# Patient Record
Sex: FEMALE | Race: BLACK OR AFRICAN AMERICAN | NOT HISPANIC OR LATINO | Employment: FULL TIME | URBAN - METROPOLITAN AREA
[De-identification: names, ages, dates, MRNs, and addresses within clinical notes are randomized per-mention and may not be internally consistent; named-entity substitution may affect disease eponyms.]

---

## 2020-10-01 ENCOUNTER — OFFICE VISIT (OUTPATIENT)
Dept: SURGERY | Facility: CLINIC | Age: 75
End: 2020-10-01
Payer: MEDICARE

## 2020-10-01 VITALS
RESPIRATION RATE: 16 BRPM | OXYGEN SATURATION: 98 % | SYSTOLIC BLOOD PRESSURE: 150 MMHG | DIASTOLIC BLOOD PRESSURE: 90 MMHG | HEART RATE: 87 BPM | WEIGHT: 122 LBS

## 2020-10-01 DIAGNOSIS — Z12.11 ENCOUNTER FOR SCREENING COLONOSCOPY: Primary | ICD-10-CM

## 2020-10-01 PROCEDURE — 99203 PR OFFICE/OUTPT VISIT, NEW, LEVL III, 30-44 MIN: ICD-10-PCS | Mod: S$GLB,,, | Performed by: REGISTERED NURSE

## 2020-10-01 PROCEDURE — 99203 OFFICE O/P NEW LOW 30 MIN: CPT | Mod: S$GLB,,, | Performed by: REGISTERED NURSE

## 2020-10-01 RX ORDER — HYDROCHLOROTHIAZIDE 12.5 MG/1
CAPSULE ORAL
COMMUNITY
Start: 2020-08-06 | End: 2023-01-17

## 2020-10-01 RX ORDER — PRAVASTATIN SODIUM 20 MG/1
TABLET ORAL
COMMUNITY
Start: 2020-08-06

## 2020-10-01 RX ORDER — METHIMAZOLE 5 MG/1
TABLET ORAL
COMMUNITY
Start: 2020-08-06 | End: 2023-01-17

## 2020-10-01 RX ORDER — CLOTRIMAZOLE AND BETAMETHASONE DIPROPIONATE 10; .64 MG/G; MG/G
CREAM TOPICAL
COMMUNITY
Start: 2020-08-24 | End: 2023-01-17

## 2020-10-01 NOTE — LETTER
October 2, 2020      Melissa Vega MD  [address]  [place] [address]           [facility] General Surgery  [address] [address]  Phone: [phone]  Fax: [phone]          Patient: Sylvain Angeles   MR Number: [id number]   YOB: 1945   Date of Visit: 10/1/2020       Dear Dr. Melissa Vega:    Thank you for referring Sylvain Angeles to me for evaluation. Attached you will find relevant portions of my assessment and plan of care.    If you have questions, please do not hesitate to call me. I look forward to following Sylvain Angeles along with you.    Sincerely,    Magdy Mcneill, NP    Enclosure  CC:  No Recipients    If you would like to receive this communication electronically, please contact [email or web address] or [phone] to request more information on [employer] access.    For providers and/or their staff who would like to refer a patient to [facility], please contact us through our one-stop-shop provider referral line, Critical access hospitalierge, at [phone].    If you feel you have received this communication in error or would no longer like to receive these types of communications, please e-mail [email or web address]

## 2020-10-02 NOTE — PROGRESS NOTES
Subjective:       Patient ID: Sylvain Angeles is a 74 y.o. female.    Chief Complaint: Colon Cancer Screening    74-year-old female for by her primary care physician for screening colonoscopy.  Patient denies any history blood in stool, chronic constipation or other complications.  Patient denies any family history of colon cancer.  Patient does state that she had a previous colonoscopy approximately 15 years ago which was negative.    Review of Systems   Constitutional: Negative for chills, diaphoresis, fatigue and fever.   HENT: Negative for nasal congestion and rhinorrhea.    Eyes: Negative for discharge, redness and itching.   Respiratory: Negative for apnea, chest tightness and shortness of breath.    Cardiovascular: Negative for chest pain.   Gastrointestinal: Negative for abdominal distention.   Genitourinary: Negative for difficulty urinating and dysuria.   Musculoskeletal: Negative for arthralgias and gait problem.   Integumentary:  Negative for color change, pallor and rash.   Neurological: Negative for dizziness, syncope, weakness and headaches.   Psychiatric/Behavioral: Negative for behavioral problems.         Objective:      Physical Exam  Vitals signs reviewed.   Constitutional:       Appearance: She is well-developed.   HENT:      Head: Normocephalic.   Eyes:      Conjunctiva/sclera: Conjunctivae normal.   Neck:      Musculoskeletal: Normal range of motion.   Cardiovascular:      Rate and Rhythm: Normal rate and regular rhythm.   Pulmonary:      Effort: Pulmonary effort is normal.      Breath sounds: Normal breath sounds.   Abdominal:      General: Bowel sounds are normal.      Palpations: Abdomen is soft.   Musculoskeletal: Normal range of motion.   Skin:     General: Skin is warm and dry.      Capillary Refill: Capillary refill takes less than 2 seconds.   Neurological:      Mental Status: She is alert and oriented to person, place, and time.         Assessment:       No diagnosis found.    Plan:        Procedure for colonoscopy discussed with patient in detail including:  Bowel prep, diet and postprocedure instructions.  All patient's questions were answered to her satisfaction.  Patient stated understanding everything discussed in today's visit.  She was in agreement to proceed with scheduling screening colonoscopy.    Schedule for screening colonoscopy.

## 2020-10-13 LAB
CALCIUM BLD-MCNC: NEGATIVE MG/DL
COVID-19 AB, IGM: NEGATIVE
PATIENT EMPLOYED IN HEALTHCARE: NO
PATIENT HAS SYMPTOMS FOR CONDITION OF INTEREST: NO
PATIENT HOSPITALIZED BC COND: NO
PATIENT IN CONGREGATE CARE: NO

## 2020-10-15 ENCOUNTER — OUTSIDE PLACE OF SERVICE (OUTPATIENT)
Dept: SURGERY | Facility: CLINIC | Age: 75
End: 2020-10-15
Payer: MEDICARE

## 2020-10-15 LAB — GLUCOSE SERPL-MCNC: 149 MG/DL (ref 70–105)

## 2020-10-15 PROCEDURE — 45385 COLONOSCOPY W/LESION REMOVAL: CPT | Mod: PT,,, | Performed by: SURGERY

## 2020-10-15 PROCEDURE — 45385 PR COLONOSCOPY,REMV LESN,SNARE: ICD-10-PCS | Mod: PT,,, | Performed by: SURGERY

## 2020-10-19 LAB
SPECIMEN TO PATHOLOGY: NORMAL

## 2022-12-20 ENCOUNTER — TELEPHONE (OUTPATIENT)
Dept: GASTROENTEROLOGY | Facility: CLINIC | Age: 77
End: 2022-12-20
Payer: MEDICAID

## 2022-12-20 RX ORDER — GABAPENTIN 100 MG/1
100 CAPSULE ORAL 3 TIMES DAILY
COMMUNITY
End: 2023-01-17

## 2022-12-20 NOTE — TELEPHONE ENCOUNTER
Pt called stating for the past month she has been having a lot of abdominal pain for the past month.She also states that she has been having constipation. She just completed a Rx of Linzess 72 mcg. Pt reports going to the ER several times. Went several times to [facility] and the first time she went to [facility]. Will print records and scan into Epic. Please advise.

## 2022-12-21 ENCOUNTER — TELEPHONE (OUTPATIENT)
Dept: GASTROENTEROLOGY | Facility: CLINIC | Age: 77
End: 2022-12-21
Payer: MEDICAID

## 2022-12-21 NOTE — TELEPHONE ENCOUNTER
I contacted patient to let her know that if she feels she needs to be seen soon, she will need to contact her pcp office to get referred to provider that will have sooner appointment availability. Patient stated understanding. BF

## 2022-12-21 NOTE — TELEPHONE ENCOUNTER
This patient is not an established RMM or NBP patient that I can find. She will need to have her PHCP evaluate her and send a referral to GI (accepting new patients) if the PHCP feels it is appropriate.  NBP

## 2023-01-10 DIAGNOSIS — K80.20 GALLBLADDER CALCULUS WITHOUT CHOLECYSTITIS AND NO OBSTRUCTION: Primary | ICD-10-CM

## 2023-01-11 DIAGNOSIS — R10.9 ABDOMINAL PAIN: Primary | ICD-10-CM

## 2023-01-12 ENCOUNTER — TELEPHONE (OUTPATIENT)
Dept: GASTROENTEROLOGY | Facility: CLINIC | Age: 78
End: 2023-01-12
Payer: MEDICAID

## 2023-01-17 ENCOUNTER — OFFICE VISIT (OUTPATIENT)
Dept: SURGERY | Facility: CLINIC | Age: 78
End: 2023-01-17
Payer: MEDICARE

## 2023-01-17 DIAGNOSIS — K80.20 CALCULUS OF GALLBLADDER WITHOUT CHOLECYSTITIS WITHOUT OBSTRUCTION: Primary | ICD-10-CM

## 2023-01-17 DIAGNOSIS — B02.29 POST HERPETIC NEURALGIA: ICD-10-CM

## 2023-01-17 PROCEDURE — 1160F RVW MEDS BY RX/DR IN RCRD: CPT | Mod: CPTII,S$GLB,, | Performed by: SURGERY

## 2023-01-17 PROCEDURE — 1159F MED LIST DOCD IN RCRD: CPT | Mod: CPTII,S$GLB,, | Performed by: SURGERY

## 2023-01-17 PROCEDURE — 99213 PR OFFICE/OUTPT VISIT, EST, LEVL III, 20-29 MIN: ICD-10-PCS | Mod: S$GLB,,, | Performed by: SURGERY

## 2023-01-17 PROCEDURE — 1159F PR MEDICATION LIST DOCUMENTED IN MEDICAL RECORD: ICD-10-PCS | Mod: CPTII,S$GLB,, | Performed by: SURGERY

## 2023-01-17 PROCEDURE — 1160F PR REVIEW ALL MEDS BY PRESCRIBER/CLIN PHARMACIST DOCUMENTED: ICD-10-PCS | Mod: CPTII,S$GLB,, | Performed by: SURGERY

## 2023-01-17 PROCEDURE — 99213 OFFICE O/P EST LOW 20 MIN: CPT | Mod: S$GLB,,, | Performed by: SURGERY

## 2023-01-17 RX ORDER — IPRATROPIUM BROMIDE AND ALBUTEROL SULFATE 2.5; .5 MG/3ML; MG/3ML
SOLUTION RESPIRATORY (INHALATION)
COMMUNITY
Start: 2022-11-01

## 2023-01-17 RX ORDER — METOPROLOL TARTRATE 100 MG/1
100 TABLET ORAL 2 TIMES DAILY
COMMUNITY
Start: 2022-09-18

## 2023-01-17 RX ORDER — FLUTICASONE FUROATE, UMECLIDINIUM BROMIDE AND VILANTEROL TRIFENATATE 100; 62.5; 25 UG/1; UG/1; UG/1
1 POWDER RESPIRATORY (INHALATION)
COMMUNITY
Start: 2022-11-29

## 2023-01-17 RX ORDER — CLOPIDOGREL BISULFATE 75 MG/1
75 TABLET ORAL
COMMUNITY
Start: 2022-12-06

## 2023-01-17 RX ORDER — PREDNISONE 20 MG/1
40 TABLET ORAL
COMMUNITY
Start: 2022-12-06

## 2023-01-17 RX ORDER — FUROSEMIDE 20 MG/1
20 TABLET ORAL EVERY MORNING
COMMUNITY
Start: 2022-12-28

## 2023-01-17 RX ORDER — HYDROCODONE BITARTRATE AND ACETAMINOPHEN 5; 325 MG/1; MG/1
TABLET ORAL
COMMUNITY
Start: 2023-01-14

## 2023-01-17 RX ORDER — METFORMIN HYDROCHLORIDE 500 MG/1
TABLET ORAL
COMMUNITY
Start: 2022-07-22

## 2023-01-17 NOTE — PROGRESS NOTES
Subjective:       Patient ID: Sylvain Angeles is a 77 y.o. female.    Chief Complaint: Gall Bladder Problem    Ms Angeles is referred by Loretta Miramontes NP, at [facility] for evaluation of abdominal pain and gallstones.  Patient states she is had right-sided abdominal pain, unrelenting, for several weeks.  She developed shingles about the time of the onset of the pain.  She describes the pain as burning, constant, with intermittent exacerbations that feel like electrical shocks.  Occasional mild nausea.  No association with any foods.  She is has been seen in various emergency room several times without a specific diagnosis being made.  The only imaging abnormality was a single small gallstone on an ultrasound.  No significant abnormalities on any of the lab work I reviewed.  It seems that her symptoms radiate around the right flank onto the abdominal wall.  She is noted to have evidence of healed shingles lesions in the right flank.  Surgical opinion requested, specifically for an opinion about the gallbladder.    Note that the patient is on prednisone 40 mg daily but she says she does not know why she is taking that.  She says she was on prednisone prior to developing the shingles.  Also noted prior coronary artery bypass surgery in 2018 at [facility].  She says she has not seen a cardiologist for several years due to insurance issues.    Past Medical History:   Diagnosis Date    Atherosclerotic heart disease     Chronic vaginitis     Diabetes mellitus     Hypertension     Hyperthyroidism     Major depressive disorder      Past Surgical History:   Procedure Laterality Date    CORONARY ARTERY BYPASS GRAFT      x 4     THORACENTESIS      TUBAL LIGATION       Family History   Family history unknown: Yes     Social History     Socioeconomic History    Marital status:    Tobacco Use    Smoking status: Every Day     Types: Cigarettes    Smokeless tobacco: Never       Current Outpatient  Medications   Medication Sig Dispense Refill    albuterol-ipratropium (DUO-NEB) 2.5 mg-0.5 mg/3 mL nebulizer solution USE 1 vial PER NEBULIZER as needed FOR shortness of breath/wheezing every 6 HOURS      clopidogreL (PLAVIX) 75 mg tablet Take 75 mg by mouth.      furosemide (LASIX) 20 MG tablet Take 20 mg by mouth every morning.      HYDROcodone-acetaminophen (NORCO) 5-325 mg per tablet TAKE 1 TABLET BY MOUTH EVERY 6 HOURS FOR PAIN      metFORMIN (GLUCOPHAGE) 500 MG tablet take 1 tablet with A meal Orally ONCE A DAY 90 DAYS]      metoprolol tartrate (LOPRESSOR) 100 MG tablet Take 100 mg by mouth 2 (two) times daily.      pravastatin (PRAVACHOL) 20 MG tablet       predniSONE (DELTASONE) 20 MG tablet Take 40 mg by mouth.      TRELEGY ELLIPTA 100-62.5-25 mcg DsDv Take 1 puff by mouth.       No current facility-administered medications for this visit.     Review of patient's allergies indicates:   Allergen Reactions    Morphine     Penicillins     Xanax [alprazolam]        Review of Systems   Constitutional:  Positive for fatigue.   Respiratory: Negative.     Cardiovascular: Negative.    Gastrointestinal:  Positive for abdominal pain and nausea. Negative for abdominal distention, blood in stool, constipation, diarrhea and vomiting.        Right flank and abdominal pain as described in the HPI   Musculoskeletal: Negative.    Skin: Negative.    Neurological: Negative.    Hematological: Negative.      Objective:      There were no vitals filed for this visit.  Physical Exam  Constitutional:       General: She is not in acute distress.  HENT:      Head: Normocephalic and atraumatic.   Cardiovascular:      Rate and Rhythm: Normal rate and regular rhythm.      Heart sounds: Normal heart sounds.   Pulmonary:      Effort: Pulmonary effort is normal.      Breath sounds: Normal breath sounds.   Abdominal:      General: Abdomen is flat. Bowel sounds are normal.      Palpations: Abdomen is soft. There is no mass.      Tenderness:  "There is no abdominal tenderness. There is no guarding.   Musculoskeletal:         General: No deformity. Normal range of motion.      Cervical back: Normal range of motion and neck supple.      Right lower leg: No edema.      Left lower leg: No edema.   Lymphadenopathy:      Cervical: No cervical adenopathy.   Skin:     General: Skin is warm and dry.      Findings: Lesion present.      Comments: Healed shingles lesions which appeared to be an a dermatome distribution from right flank to very lateral right abdominal wall.  These are in line with her area of maximum pain complaint.   Neurological:      General: No focal deficit present.      Mental Status: She is alert and oriented to person, place, and time.   Psychiatric:      Comments: Tearful and frustrated due to chronic pain.  She is also upset that she has been labeled as a "drug seeker".       Lab Review:  Lab results reviewed  Diagnostics Review:  CT and ultrasound reports reviewed     Assessment:       1. Cholelithiasis, likely asymptomatic  2. Right flank abdominal pain, which I believe represents post herpetic neuralgia   Plan:       Do not recommend gallbladder surgery at this time.  I would recommend she be treated as post herpetic neuralgia and might benefit from neurologic evaluation.  "

## 2023-02-06 ENCOUNTER — OUTSIDE PLACE OF SERVICE (OUTPATIENT)
Dept: GASTROENTEROLOGY | Facility: CLINIC | Age: 78
End: 2023-02-06

## 2023-02-06 ENCOUNTER — OUTSIDE PLACE OF SERVICE (OUTPATIENT)
Dept: GASTROENTEROLOGY | Facility: CLINIC | Age: 78
End: 2023-02-06
Payer: MEDICARE

## 2023-02-06 PROCEDURE — 99223 PR INITIAL HOSPITAL CARE,LEVL III: ICD-10-PCS | Mod: 25,,, | Performed by: INTERNAL MEDICINE

## 2023-02-06 PROCEDURE — 43235 PR EGD, FLEX, DIAGNOSTIC: ICD-10-PCS | Mod: ,,, | Performed by: INTERNAL MEDICINE

## 2023-02-06 PROCEDURE — 99223 1ST HOSP IP/OBS HIGH 75: CPT | Mod: 25,,, | Performed by: INTERNAL MEDICINE

## 2023-02-06 PROCEDURE — 43235 EGD DIAGNOSTIC BRUSH WASH: CPT | Mod: ,,, | Performed by: INTERNAL MEDICINE

## 2023-02-07 ENCOUNTER — OUTSIDE PLACE OF SERVICE (OUTPATIENT)
Dept: GASTROENTEROLOGY | Facility: CLINIC | Age: 78
End: 2023-02-07
Payer: MEDICARE

## 2023-02-07 PROCEDURE — 99223 1ST HOSP IP/OBS HIGH 75: CPT | Mod: 25,,, | Performed by: INTERNAL MEDICINE

## 2023-02-07 PROCEDURE — 99223 PR INITIAL HOSPITAL CARE,LEVL III: ICD-10-PCS | Mod: 25,,, | Performed by: INTERNAL MEDICINE

## 2023-04-05 ENCOUNTER — OUTSIDE PLACE OF SERVICE (OUTPATIENT)
Dept: PULMONOLOGY | Facility: CLINIC | Age: 78
End: 2023-04-05
Payer: MEDICARE

## 2023-04-05 PROCEDURE — 99223 1ST HOSP IP/OBS HIGH 75: CPT | Mod: FS,,, | Performed by: INTERNAL MEDICINE

## 2023-04-05 PROCEDURE — 99223 PR INITIAL HOSPITAL CARE,LEVL III: ICD-10-PCS | Mod: FS,,, | Performed by: INTERNAL MEDICINE

## 2023-04-06 ENCOUNTER — OUTSIDE PLACE OF SERVICE (OUTPATIENT)
Dept: PULMONOLOGY | Facility: CLINIC | Age: 78
End: 2023-04-06
Payer: MEDICARE

## 2023-04-06 PROCEDURE — 99232 SBSQ HOSP IP/OBS MODERATE 35: CPT | Mod: FS,,, | Performed by: INTERNAL MEDICINE

## 2023-04-06 PROCEDURE — 99232 PR SUBSEQUENT HOSPITAL CARE,LEVL II: ICD-10-PCS | Mod: FS,,, | Performed by: INTERNAL MEDICINE

## 2023-07-10 ENCOUNTER — TELEPHONE (OUTPATIENT)
Dept: GASTROENTEROLOGY | Facility: CLINIC | Age: 78
End: 2023-07-10
Payer: MEDICARE

## 2023-07-10 NOTE — TELEPHONE ENCOUNTER
----- Message from Rach Simmons sent at 7/7/2023  4:49 PM CDT -----    ----- Message -----  From: Jessika Galaviz  Sent: 7/6/2023   1:54 PM CDT  To: [facility] Staff    Jessika king calling to check status of referral appt and she can be reached at [phone] ext [phone].    Thanks,

## 2025-06-11 ENCOUNTER — TELEPHONE (OUTPATIENT)
Dept: HEMATOLOGY/ONCOLOGY | Facility: CLINIC | Age: 80
End: 2025-06-11
Payer: MEDICAID

## 2025-06-11 NOTE — TELEPHONE ENCOUNTER
Called the patient 5/30/25, 6/10/25, 6/11/25 regarding referral. No answer and cannot leave a VM. TTRN